# Patient Record
(demographics unavailable — no encounter records)

---

## 2024-10-31 NOTE — HISTORY OF PRESENT ILLNESS
[de-identified] : Referred by: Dr. Sapphire Sharma PCP: Dr. Sue Carr  Diagnosis: Breast Cancer    Ms. Watkins is a post-menopausal female who initially presented at age 55 in 2024 for evaluation of newly diagnosed left breast cancer.  The patient has a medical history of carpal tunnel surgery.   Sarah underwent a screening mammogram in 2024 which revealed a focal asymmetry in the left breast.  Her last mammogram had been 4 years prior to presentation. She denied any breast complaints at that time including breast mass, nipple discharge or breast pain. Diagnostic imaging confirmed a 7 mm hypoechoic mass in the left breast and biopsy was recommended.  Left breast biopsy on 2024 revealed invasive ductal carcinoma, well-differentiated, measuring at least 4 mm, ER greater than 90%, MA 80% and HER2 negative. Breast MRI was performed on 24 and revealed an irregular enhancing mass in the upper central left breast measuring 1.1cm, dominant 4mm focus in the outer left breast. Additional left breast biopsy was benign.  Genetic testing was sent and was negative. She was planning for a left mastectomy with TE placement w/ Dr. Sharma and Dr. Ramos.   She presented to discuss adjuvant therapy for hormone positive breast cancer.  Imaging reviewed: Screening MMG 24- focal asymmetry in the left breast - BIRADS 0 Left breast diagnostic MMG/US 3/12/24- 7mm hypoechoic mass with irregular margins, biopsy recommended, no abnormal LAD, BIRADS 4C Breast MRI 24- irregular enhancing mass in the upper central left breast measuring 1.1cm, dominant 4mm focus in the outer left breast, rec MRI bx, BIRADS 4B  Pathology reviewed: Left breast biopsy 3/22/24- IDC, well differentiated, measuring at least 4mm, ADH, LCIS (classic type), ER >90%, MA 80%, Her2 negative 1+ Left breast biopsy 24- cysts, columnar cell changes  Genetics: Myriad negative 3/27/24    HCM: - Colonoscopy: never done, DUE  - Gyn/pap: pap 24- negative for intraepithelial lesion or malignancy  - Mammo: as above  - Lung cancer screen: reportedly did CT chest for lung cancer screen in March  - DEXA: 24- osteopenia femoral neck T -1.4   SH: - Occupation: she sells wine - Living situation: lives in [place]  - Smoking/etoh/illicits: drinks 1-2 drinks per day, former smoker, quit in 2024 - Exercise: she is active, gardens    OB/GYN Hx: - Age of menarche: 15 - Age of menopause: 45 - Pregnancy history:  - Age at live birth: 26 - OCP use: n/a, IUD  - Fertility treatments:  n/a - Hormonal therapy: n/a - Breast feeding history: 13 months, 8 months    FH: - No family history of breast cancer  [de-identified] : Sarah (Satya) presents on 10/31/24 for follow up for breast cancer.  S/p Left mastectomy w/ SLNbx on 6/6/24 with Dr. Sapphire Sharma and Dr. Lissa Crane  Final pathology revealed IDC, well differentiated, measuring 10mm, extensive LCIS, ADH, LN 0/7 pT1b,N0 Developed a tissue expander infection in early July, s/p tissue expander removal and washout on 7/5/24 Path revealed ulcerated skin with acute inflammation and gangrenous necrosis, breast tissue culture grew group B strep.  Completed IV antibiotics via PICC line Her oncotype score has resulted as 8 with 3% risk of distant recurrence at 9 years and <1% absolute chemotherapy benefit. She has a new tissue expander in place; planned for left IEX with right oncoplastics on 11/15/24 w/ Dr. Crane.  Started Arimidex 9/1/24  At today's visit Satya reports several concerns since starting Arimidex ~ 8 weeks ago.  Hot flashes (present x 10 yrs) have exacerbated, causing nightly sleep disturbance.  She is having generalized arthralgias and worsening of bilateral carpal tunnel symptoms (hx previous open repair on left, will eventually need right CTS).  Her mood is down at times (no SI/HI).  Also notes some "brain fog".  She is preparing for L implant exchange w/ right oncoplastic surgery on 11/14/24 and understandably reluctant to make too many changes prior to surgery.  Will take prophylactic antibiotics around the surgery given hx B strep infection.  She otherwise denies recent headaches, dizziness, visual changes, balance issues, CP, cough, SOB, n/v/d, constipation, unintentional weight loss or new bone pain.  DEXA 2/27/24- mild osteopenia T -1.4

## 2024-10-31 NOTE — HISTORY OF PRESENT ILLNESS
[de-identified] : Referred by: Dr. Sapphire Sharma PCP: Dr. Sue Carr  Diagnosis: Breast Cancer    Ms. Watkins is a post-menopausal female who initially presented at age 55 in 2024 for evaluation of newly diagnosed left breast cancer.  The patient has a medical history of carpal tunnel surgery.   Sarah underwent a screening mammogram in 2024 which revealed a focal asymmetry in the left breast.  Her last mammogram had been 4 years prior to presentation. She denied any breast complaints at that time including breast mass, nipple discharge or breast pain. Diagnostic imaging confirmed a 7 mm hypoechoic mass in the left breast and biopsy was recommended.  Left breast biopsy on 2024 revealed invasive ductal carcinoma, well-differentiated, measuring at least 4 mm, ER greater than 90%, VT 80% and HER2 negative. Breast MRI was performed on 24 and revealed an irregular enhancing mass in the upper central left breast measuring 1.1cm, dominant 4mm focus in the outer left breast. Additional left breast biopsy was benign.  Genetic testing was sent and was negative. She was planning for a left mastectomy with TE placement w/ Dr. Sharma and Dr. Ramos.   She presented to discuss adjuvant therapy for hormone positive breast cancer.  Imaging reviewed: Screening MMG 24- focal asymmetry in the left breast - BIRADS 0 Left breast diagnostic MMG/US 3/12/24- 7mm hypoechoic mass with irregular margins, biopsy recommended, no abnormal LAD, BIRADS 4C Breast MRI 24- irregular enhancing mass in the upper central left breast measuring 1.1cm, dominant 4mm focus in the outer left breast, rec MRI bx, BIRADS 4B  Pathology reviewed: Left breast biopsy 3/22/24- IDC, well differentiated, measuring at least 4mm, ADH, LCIS (classic type), ER >90%, VT 80%, Her2 negative 1+ Left breast biopsy 24- cysts, columnar cell changes  Genetics: Myriad negative 3/27/24    HCM: - Colonoscopy: never done, DUE  - Gyn/pap: pap 24- negative for intraepithelial lesion or malignancy  - Mammo: as above  - Lung cancer screen: reportedly did CT chest for lung cancer screen in March  - DEXA: 24- osteopenia femoral neck T -1.4   SH: - Occupation: she sells wine - Living situation: lives in [place]  - Smoking/etoh/illicits: drinks 1-2 drinks per day, former smoker, quit in 2024 - Exercise: she is active, gardens    OB/GYN Hx: - Age of menarche: 15 - Age of menopause: 45 - Pregnancy history:  - Age at live birth: 26 - OCP use: n/a, IUD  - Fertility treatments:  n/a - Hormonal therapy: n/a - Breast feeding history: 13 months, 8 months    FH: - No family history of breast cancer  [de-identified] : Sarah (Satya) presents on 10/31/24 for follow up for breast cancer.  S/p Left mastectomy w/ SLNbx on 6/6/24 with Dr. Sapphire Sharma and Dr. Lissa Crane  Final pathology revealed IDC, well differentiated, measuring 10mm, extensive LCIS, ADH, LN 0/7 pT1b,N0 Developed a tissue expander infection in early July, s/p tissue expander removal and washout on 7/5/24 Path revealed ulcerated skin with acute inflammation and gangrenous necrosis, breast tissue culture grew group B strep.  Completed IV antibiotics via PICC line Her oncotype score has resulted as 8 with 3% risk of distant recurrence at 9 years and <1% absolute chemotherapy benefit. She has a new tissue expander in place; planned for left IEX with right oncoplastics on 11/15/24 w/ Dr. Crane.  Started Arimidex 9/1/24  At today's visit Satya reports several concerns since starting Arimidex ~ 8 weeks ago.  Hot flashes (present x 10 yrs) have exacerbated, causing nightly sleep disturbance.  She is having generalized arthralgias and worsening of bilateral carpal tunnel symptoms (hx previous open repair on left, will eventually need right CTS).  Her mood is down at times (no SI/HI).  Also notes some "brain fog".  She is preparing for L implant exchange w/ right oncoplastic surgery on 11/14/24 and understandably reluctant to make too many changes prior to surgery.  Will take prophylactic antibiotics around the surgery given hx B strep infection.  She otherwise denies recent headaches, dizziness, visual changes, balance issues, CP, cough, SOB, n/v/d, constipation, unintentional weight loss or new bone pain.  DEXA 2/27/24- mild osteopenia T -1.4

## 2024-10-31 NOTE — PHYSICAL EXAM
[Fully active, able to carry on all pre-disease performance without restriction] : Status 0 - Fully active, able to carry on all pre-disease performance without restriction [Normal] : affect appropriate [de-identified] : well appearing female, NAD, pleasant [de-identified] : s/p L mastectomy, healing well, tissue expander in place, no erythema, no palpable masses or LAD

## 2024-10-31 NOTE — RESULTS/DATA
[FreeTextEntry1] : #Left breast cancer- ER/KY+, Her2 negative, clinical stage IA  Diagnosed on screening mammogram in February 2024. S/p left breast biopsy on 3/22/24 which revealed invasive ductal carcinoma, well-differentiated, measuring at least 4 mm, ER >90%, KY 80% and HER2 negative.  Breast MRI was performed on 4/5/24 and revealed an irregular enhancing mass in the upper central left breast measuring 1.1cm, dominant 4mm focus in the outer left breast. Additional left breast biopsy was benign.   Genetic testing was sent and was negative.  We discussed the excellent prognosis of stage I, ER positive, KY positive, node negative breast cancer. We explained that recent data suggests that chemotherapy may be spared for many patients with this type of breast cancer (up to 85%). We discussed the use of Oncotype DX genomic profile to determine the risk of recurrence and benefit from chemotherapy based on the results of the Tailor Rx Study to better determine which patients should receive chemotherapy in addition to hormonal therapy.   Now s/p Left mastectomy w/ SLNbx on 6/6/24 with Dr. Sapphire Sharma and Dr. Lissa Crane  Final pathology revealed IDC, well differentiated, measuring 10mm, extensive LCIS, ADH, LN 0/7 pT1b,N0 She developed a tissue expander infection in early July, s/p tissue expander removal and washout on 7/5/24 Path revealed ulcerated skin with acute inflammation and gangrenous necrosis, breast tissue culture grew group B strep.  Completed IV antibiotics via PICC line  Her oncotype score has resulted as 8 with 3% risk of distant recurrence at 9 years and <1% absolute chemotherapy benefit. Fortunately no role for chemotherapy.  She has a new tissue expander in place which is being filled slowly w/ Dr. Crane. Planned for L implant exchange w/ right oncoplastics on 11/15/24    We discussed the role of antiestrogen therapy, including treatment with Arimidex 1mg PO daily for 5-7 years. Risks of hot flashes, vaginal dryness and bone loss were reviewed.  Close monitoring of bone density and calcium supplementation reviewed as well. Recommended 30 minutes of exercise daily to prevent joint pains. Started Arimidex 9/1/24 - now w/ worsening hot flushes, arthralgias, brain fog and depressed mood Discussed symptoms at length today - she would like to continue current regimen for now d/t upcoming surgery Labs today including anemia studies, vitamin D, TSH w/ reflex T4 Given resources to initiate counseling, emotional support/encouragement provided Will re-assess AI toxicities at next visit in mid-December Consider change to exemestane if arthralgias do not improve (discussed glucosamine/chondroitin and omega 3 supplements) Consider Veozah if hot flushes fail to improve DEXA 2/27/24- mild osteopenia T -1.4 Start calcium and vitamin D.  All patient questions answered at today's visit. Patient urged to call the office with any questions or concerns.  I personally have spent a total of 45 minutes of time on the date of this encounter reviewing test results, documenting findings, coordinating care and directly consulting with the patient and/or designated family member. Greater than 50% of the face to face encounter time was spent on counseling and/or coordination of care for left breast cancer.

## 2024-10-31 NOTE — PHYSICAL EXAM
[Fully active, able to carry on all pre-disease performance without restriction] : Status 0 - Fully active, able to carry on all pre-disease performance without restriction [Normal] : affect appropriate [de-identified] : well appearing female, NAD, pleasant [de-identified] : s/p L mastectomy, healing well, tissue expander in place, no erythema, no palpable masses or LAD

## 2024-10-31 NOTE — RESULTS/DATA
[FreeTextEntry1] : #Left breast cancer- ER/AR+, Her2 negative, clinical stage IA  Diagnosed on screening mammogram in February 2024. S/p left breast biopsy on 3/22/24 which revealed invasive ductal carcinoma, well-differentiated, measuring at least 4 mm, ER >90%, AR 80% and HER2 negative.  Breast MRI was performed on 4/5/24 and revealed an irregular enhancing mass in the upper central left breast measuring 1.1cm, dominant 4mm focus in the outer left breast. Additional left breast biopsy was benign.   Genetic testing was sent and was negative.  We discussed the excellent prognosis of stage I, ER positive, AR positive, node negative breast cancer. We explained that recent data suggests that chemotherapy may be spared for many patients with this type of breast cancer (up to 85%). We discussed the use of Oncotype DX genomic profile to determine the risk of recurrence and benefit from chemotherapy based on the results of the Tailor Rx Study to better determine which patients should receive chemotherapy in addition to hormonal therapy.   Now s/p Left mastectomy w/ SLNbx on 6/6/24 with Dr. Sapphire Sharma and Dr. Lissa Crane  Final pathology revealed IDC, well differentiated, measuring 10mm, extensive LCIS, ADH, LN 0/7 pT1b,N0 She developed a tissue expander infection in early July, s/p tissue expander removal and washout on 7/5/24 Path revealed ulcerated skin with acute inflammation and gangrenous necrosis, breast tissue culture grew group B strep.  Completed IV antibiotics via PICC line  Her oncotype score has resulted as 8 with 3% risk of distant recurrence at 9 years and <1% absolute chemotherapy benefit. Fortunately no role for chemotherapy.  She has a new tissue expander in place which is being filled slowly w/ Dr. Crane. Planned for L implant exchange w/ right oncoplastics on 11/15/24    We discussed the role of antiestrogen therapy, including treatment with Arimidex 1mg PO daily for 5-7 years. Risks of hot flashes, vaginal dryness and bone loss were reviewed.  Close monitoring of bone density and calcium supplementation reviewed as well. Recommended 30 minutes of exercise daily to prevent joint pains. Started Arimidex 9/1/24 - now w/ worsening hot flushes, arthralgias, brain fog and depressed mood Discussed symptoms at length today - she would like to continue current regimen for now d/t upcoming surgery Labs today including anemia studies, vitamin D, TSH w/ reflex T4 Given resources to initiate counseling, emotional support/encouragement provided Will re-assess AI toxicities at next visit in mid-December Consider change to exemestane if arthralgias do not improve (discussed glucosamine/chondroitin and omega 3 supplements) Consider Veozah if hot flushes fail to improve DEXA 2/27/24- mild osteopenia T -1.4 Start calcium and vitamin D.  All patient questions answered at today's visit. Patient urged to call the office with any questions or concerns.  I personally have spent a total of 45 minutes of time on the date of this encounter reviewing test results, documenting findings, coordinating care and directly consulting with the patient and/or designated family member. Greater than 50% of the face to face encounter time was spent on counseling and/or coordination of care for left breast cancer.

## 2024-11-05 NOTE — PHYSICAL EXAM
[FreeTextEntry1] : Gen: Patient is A&O x 3, NAD HEENT: EOMI, hearing grossly normal Resp: regular, non - labored CV: pulses regular Skin: no rashes, erythema Lymph: no clubbing, cyanosis, edema Inspection: no instability  ROM: full throughout Palpation:no tenderness to palpation Sensation: intact to light touch Reflexes: 1+ and symmetric throughout Strength: 5/5 throughout Gait: normal, non-antalgic

## 2024-11-05 NOTE — ASSESSMENT
[FreeTextEntry1] : 55 year old female presenting for evaluation.  #Aromatase Inhibitor Arthralgias/Hot flashes: -Decrease gabapentin to 100mg nightly-rx sent  -Consider duloxetine at next visit   #Post-mastectomy pain: -gabapentin -Pending reconstructive surgery, consider PT after    #At risk for lymphedema: -No clinical signs of lymphedema on exam -Lymphedema education provided to patient -Denies any electronic implantable devices  -Next surveillance in December 2024  Follow up in 4-6 weeks.

## 2024-11-05 NOTE — HISTORY OF PRESENT ILLNESS
[FreeTextEntry1] : Ms. Watkins is a 55 year old female with history of Left breast cancer- ER/ND+, Her2 negative, clinical stage IA Diagnosed on screening mammogram in February 2024.  S/p left breast biopsy on 3/22/24 which revealed invasive ductal carcinoma, well-differentiated, measuring at least 4 mm, ER >90%, ND 80% and HER2 negative.  She is s/p Left mastectomy w/ SLNbx on 6/6/24 with Dr. Sapphire Sharma and Dr. Lissa Crane, Final pathology revealed IDC, well differentiated, measuring 10mm, extensive LCIS, ADH, LN 0/7 pT1b,N0.   She developed a tissue expander infection in early July, s/p tissue expander removal and washout on 7/5/24. On arimidex.    She reports she tried gabapentin 300mg nightly.  It helped but made her tired in the morning.  Still feels hot flashes and joint pains in her body.  Denies any swelling or edema in her UE.

## 2024-11-05 NOTE — REVIEW OF SYSTEMS
[Negative] : Heme/Lymph [FreeTextEntry2] : +Hot flashes  [FreeTextEntry9] : +Intermittent chest wall pain, +Joint pains

## 2024-11-12 NOTE — HISTORY OF PRESENT ILLNESS
[FreeTextEntry1] : Pt presents for follow up to discuss her surgery scheduled for this Friday and to review the silicone implant consent. She reports she has been thinking about it and is leaning toward not doing any procedures on her right breast. She has been living with the discrepancy in size for a few months now and it is not bothering her as much as it was in the beginning. This has caused her to question whether she really needs to do anything to disrupt the right breast at all.

## 2024-11-12 NOTE — PHYSICAL EXAM
[NI] : Normal [de-identified] : Left TE in place   No erythema, dehiscence, edema, no seroma, no pus or evidence of infection.   [de-identified] : no left breast, s/p mastectomy Right breast without change.

## 2024-11-12 NOTE — ASSESSMENT
[FreeTextEntry1] : We discussed changing her surgical plan to just addressing the left side for now. She understands that is the asymmetry is problematic in the future, that we can go back and do the right breast reduction if needed. For now, we will proceed with left breast implant exchange and autologous fat grafting from the abdomen to the left reconstructed breast. We discussed implant risks and went over the [facility] silicone implant consent checklist page by page. In summary, we discussed that implants are not lifetime devices. There are risks of capsular contracture (higher after radiation), implant rupture, breast implant associated lymphoma (NISHA-ALCL), infection requiring removal and poor position and/or contour. We discussed systemic symptoms that may be associated with breast implants that may or may not resolve with removal. She is on preoperative antibiotics currently due to her prior left TE infection, in order to decrease her risk of implant infection.

## 2024-11-20 NOTE — ASSESSMENT
[FreeTextEntry1] : 56 y/o female for follow up   Healing well Follow up in 2-3 weeks. All questions and concerns addressed. Patient may drive as long as she can move arms freely and is no longer taking narcotics.  Plan and patient status as per Dr Crane.

## 2024-11-20 NOTE — REASON FOR VISIT
[Follow-Up: _____] : a [unfilled] follow-up visit [FreeTextEntry1] : from post op has soreness from surgery. Feeling dizzy and tired

## 2024-11-20 NOTE — PHYSICAL EXAM
[NI] : Normal [de-identified] : Right breast with mild ecchymosis in area of fat grafting.  [de-identified] : No left breast, s/p mastectomy. Left implant in place with moderate ecchymosis, mild edema, no erythema or dehiscence. [de-identified] : Fat grafting donor site with sutures in place, closed and healing, no erythema, edema, or continued leakage.

## 2024-11-20 NOTE — HISTORY OF PRESENT ILLNESS
[FreeTextEntry1] : Patient presents for follow up and states that she is feeling a bit sore in her abdomen like she performed sit ups.  She states that she is really happy with the results of her surgery and took a shower last night.  She states that as of now, she will likely have a tattoo of the NAC and no creation of a nipple.  She has no complaints today.

## 2024-11-20 NOTE — PHYSICAL EXAM
[NI] : Normal [de-identified] : Right breast with mild ecchymosis in area of fat grafting.  [de-identified] : No left breast, s/p mastectomy. Left implant in place with moderate ecchymosis, mild edema, no erythema or dehiscence. [de-identified] : Fat grafting donor site with sutures in place, closed and healing, no erythema, edema, or continued leakage.

## 2024-12-12 NOTE — PHYSICAL EXAM
[NI] : Normal [de-identified] : Left implant in place, soft. No erythema, dehiscence, edema, no seroma, no pus or evidence of infection.   She does have a punctate fine red rash over the left breast and laterally on the right breast. [de-identified] : no left breast, s/p mastectomy Right breast without change.

## 2024-12-12 NOTE — ASSESSMENT
[FreeTextEntry1] : Doing well overall Pt using a new lotion over the left breast and some other areas. I advised her to stop the lotion and use something more hypoallergenic. There is no evidence of erythema or infection. Follow up in two months. Call for any questions or concerns.

## 2024-12-12 NOTE — HISTORY OF PRESENT ILLNESS
[FreeTextEntry1] : Pt reports she is doing well. Some soreness laterally and occasionally superiorly.

## 2024-12-12 NOTE — REASON FOR VISIT
[Follow-Up: _____] : a [unfilled] follow-up visit [FreeTextEntry1] : Patient is following up from her surgery 1 month ago, she states she still has soreness and is taking it easy with lifting. No concerns at this time.

## 2024-12-12 NOTE — PHYSICAL EXAM
[NI] : Normal [de-identified] : Left implant in place, soft. No erythema, dehiscence, edema, no seroma, no pus or evidence of infection.   She does have a punctate fine red rash over the left breast and laterally on the right breast. [de-identified] : no left breast, s/p mastectomy Right breast without change.

## 2024-12-19 NOTE — RESULTS/DATA
[FreeTextEntry1] : #Left breast cancer- ER/[place]+, Her2 negative, clinical stage IA  Diagnosed on screening mammogram in February 2024. S/p left breast biopsy on 3/22/24 which revealed invasive ductal carcinoma, well-differentiated, measuring at least 4 mm, ER >90%, [place] 80% and HER2 negative.  Breast MRI was performed on 4/5/24 and revealed an irregular enhancing mass in the upper central left breast measuring 1.1cm, dominant 4mm focus in the outer left breast. Additional left breast biopsy was benign.   Genetic testing was sent and was negative.  We discussed the excellent prognosis of stage I, ER positive, W[place] positive, node negative breast cancer. We explained that recent data suggests that chemotherapy may be spared for many patients with this type of breast cancer (up to 85%). We discussed the use of Oncotype DX genomic profile to determine the risk of recurrence and benefit from chemotherapy based on the results of the [employer] Rx Study to better determine which patients should receive chemotherapy in addition to hormonal therapy.   Now s/p Left mastectomy w/ SLNbx on 6/6/24 with Dr. Sapphire Sharma and Dr. Lissa Crane  Final pathology revealed IDC, well differentiated, measuring 10mm, extensive LCIS, ADH, LN 0/7 pT1b,N0 She developed a tissue expander infection in early July, s/p tissue expander removal and washout on 7/5/24 Path revealed ulcerated skin with acute inflammation and gangrenous necrosis, breast tissue culture grew group B strep.  Completed IV antibiotics via PICC line  Her oncotype score has resulted as 8 with 3% risk of distant recurrence at 9 years and <1% absolute chemotherapy benefit. Fortunately no role for chemotherapy.  She had a new tissue expander placed; now s/p L implant exchange w/ Dr. Crane 11/15/24 and healing well She elected not to proceed w/ right oncoplastics    We discussed the role of antiestrogen therapy, including treatment with Arimidex 1mg PO daily for 5-7 years. Risks of hot flashes, vaginal dryness and bone loss were reviewed.  Close monitoring of bone density and calcium supplementation reviewed as well. Recommended 30 minutes of exercise daily to prevent joint pains. Started Arimidex 9/1/24 - now w/ worsening hot flushes, arthralgias, brain fog and depressed mood. Hot flushes slowly improving - no interest in medication management (including Veozah) at this time Discussed use of vaginal lubricants, water based moisturizers and role of low dose vaginal estrogen if needed (felt to have minimal systemic absorption and convey low risk for breast cancer recurrence) Discussed symptoms at length today - she would like to continue Anastrozole for now Has been given resources to initiate counseling, emotional support/encouragement provided Consider change to exemestane if arthralgias exacerbate (discussed glucosamine/chondroitin and omega 3 supplements) DEXA 2/27/24- mild osteopenia T -1.4 Started calcium and vitamin D.  All patient questions answered at today's visit. Patient urged to call the office with any questions or concerns.  I personally have spent a total of 40 minutes of time on the date of this encounter reviewing test results, documenting findings, coordinating care and directly consulting with the patient and/or designated family member. Greater than 50% of the face to face encounter time was spent on counseling and/or coordination of care for left breast cancer.

## 2024-12-19 NOTE — HISTORY OF PRESENT ILLNESS
[de-identified] : Referred by: Dr. Sapphire Sharma PCP: Dr. Sue Carr  Diagnosis: Breast Cancer    Ms. Watkins is a post-menopausal female who initially presented at age 55 in 2024 for evaluation of newly diagnosed left breast cancer.  The patient has a medical history of carpal tunnel surgery.   Sarah underwent a screening mammogram in 2024 which revealed a focal asymmetry in the left breast.  Her last mammogram had been 4 years prior to presentation. She denied any breast complaints at that time including breast mass, nipple discharge or breast pain. Diagnostic imaging confirmed a 7 mm hypoechoic mass in the left breast and biopsy was recommended.  Left breast biopsy on 2024 revealed invasive ductal carcinoma, well-differentiated, measuring at least 4 mm, ER greater than 90%, [place] 80% and HER2 negative. Breast MRI was performed on 24 and revealed an irregular enhancing mass in the upper central left breast measuring 1.1cm, dominant 4mm focus in the outer left breast. Additional left breast biopsy was benign.  Genetic testing was sent and was negative. She was planning for a left mastectomy with TE placement w/ Dr. Sharma and Dr. Ramos.   She presented to discuss adjuvant therapy for hormone positive breast cancer.  Imaging reviewed: Screening MMG 24- focal asymmetry in the left breast - BIRADS 0 Left breast diagnostic MMG/US 3/12/24- 7mm hypoechoic mass with irregular margins, biopsy recommended, no abnormal LAD, BIRADS 4C Breast MRI 24- irregular enhancing mass in the upper central left breast measuring 1.1cm, dominant 4mm focus in the outer left breast, rec MRI bx, BIRADS 4B  Pathology reviewed: Left breast biopsy 3/22/24- IDC, well differentiated, measuring at least 4mm, ADH, LCIS (classic type), ER >90%, IL 80%, Her2 negative 1+ Left breast biopsy 24- cysts, columnar cell changes  Genetics: Myriad negative 3/27/24    HCM: - Colonoscopy: never done, DUE  - Gyn/pap: pap 24- negative for intraepithelial lesion or malignancy  - Mammo: as above  - Lung cancer screen: reportedly did CT chest for lung cancer screen in March  - DEXA: 24- osteopenia femoral neck T -1.4   SH: - Occupation: she sells wine - Living situation: lives in [place]  - Smoking/etoh/illicits: drinks 1-2 drinks per day, former smoker, quit in 2024 - Exercise: she is active, gardens    OB/GYN Hx: - Age of menarche: 15 - Age of menopause: 45 - Pregnancy history:  - Age at live birth: 26 - OCP use: n/a, IUD  - Fertility treatments:  n/a - Hormonal therapy: n/a - Breast feeding history: 13 months, 8 months    FH: - No family history of breast cancer  [de-identified] : Sarah (Satya) presents on 12/19/24 for follow up for breast cancer.  S/p Left mastectomy w/ SLNbx on 6/6/24 with Dr. Sapphire Sharma and Dr. Lissa Crane  Final pathology revealed IDC, well differentiated, measuring 10mm, extensive LCIS, ADH, LN 0/7 pT1b,N0 Developed a tissue expander infection in early July, s/p tissue expander removal and washout on 7/5/24 Path revealed ulcerated skin with acute inflammation and gangrenous necrosis, breast tissue culture grew group B strep.  Completed IV antibiotics via PICC line Her oncotype score has resulted as 8 with 3% risk of distant recurrence at 9 years and <1% absolute chemotherapy benefit. She had a new tissue expander placed; now s/p L IEX on 11/15/24 w/ Dr. Crane (elected to defer R oncoplastics). Started Arimidex 9/1/24  At today's visit Satya continues Arimidex daily and is feeling a little better although several symptoms persist.  Hot flushes are slightly improved - no interest in medication management at present.  Pre-existing dyspareunia exacerbated w/ onset of AI therapy despite water based lubricants.  Mood somewhat improved.  Still with brain fog.  Mild joint stiffness persists, improves with activity.  Hx of bilateral carpal tunnel symptoms (with previous open repair on left, will eventually need right CTS).  She notes dry, itchy skin and very faint punctate skin changes of the upper chest and upper back (likely existed prior to start of AI therapy). Denies recent breast changes, headaches, dizziness, visual changes, balance issues, CP, cough, SOB, n/v/d, constipation, unintentional weight loss or new bone pain.  Health Care Maintenance: Updated 12/19/24 Breast Imaging: Right mammogram due 3/2025 Colonoscopy:  DUE - reminded to schedule Gyn/Pap: DUE - reminded to schedule Dentist: PCP: Dermatology screen:  DUE - reminded to schedule DEXA 2/27/24- mild osteopenia T -1.4

## 2024-12-19 NOTE — PHYSICAL EXAM
[Fully active, able to carry on all pre-disease performance without restriction] : Status 0 - Fully active, able to carry on all pre-disease performance without restriction [Normal] : affect appropriate [de-identified] : well appearing female, NAD, pleasant [de-identified] : s/p L mastectomy with implant reconstruction, no erythema, no palpable masses or LAD  [de-identified] : Very faint, punctate skin changes of anterior chest and mid back noted (no evidence for recent scratching).

## 2024-12-27 NOTE — PHYSICAL EXAM
[NI] : Normal [de-identified] : Left implant in place, soft. No erythema, edema, or evidence of infection.   There may be some fluid surrounding the implant. She does have a punctate fine raised red rash over the left breast and laterally on the right breast. [de-identified] : no left breast, s/p mastectomy Right breast without change.

## 2024-12-27 NOTE — HISTORY OF PRESENT ILLNESS
[FreeTextEntry1] : Pt reports she started feeling sick "like a sinus infection" several days ago, and then tested positive for COVID.  She noticed that the left reconstructed breast felt a bit more swollen and sore. She denies fever or chills.  She does still notice the "dots" of a rash on the left breast, but also on her neck and back. She has stopped any type of perfumed lotion, and has feels it's better, but hasn't gone away. She was told it could be from the anastrozole as well.

## 2024-12-27 NOTE — PHYSICAL EXAM
[NI] : Normal [de-identified] : Left implant in place, soft. No erythema, edema, or evidence of infection.   There may be some fluid surrounding the implant. She does have a punctate fine raised red rash over the left breast and laterally on the right breast. [de-identified] : no left breast, s/p mastectomy Right breast without change.

## 2024-12-27 NOTE — ASSESSMENT
[FreeTextEntry1] : Doing well overall. She may have some increase in mayco-implant fluid with her COVID infection, but there is no clinical sign of infection. She reports she does have a derm appointment in a few weeks and will ask about the rash then. I would not recommend any antibiotics in the absence of any clinical sign of infection. She is to keep her regularly scheduled appointment (approx 6 weeks), but call before then for any issues or concerns.

## 2024-12-27 NOTE — REASON FOR VISIT
[Follow-Up: _____] : a [unfilled] follow-up visit [FreeTextEntry1] : Patient presents for a follow up today, states she is having some issues with her left breast that she would like to go over with the doctor.

## 2025-01-07 NOTE — PHYSICAL EXAM
[FreeTextEntry1] : Gen: Patient is A&O x 3, NAD HEENT: EOMI, hearing grossly normal Resp: regular, non - labored CV: pulses regular Skin: no rashes, erythema Lymph: no clubbing, cyanosis, edema Inspection: no instability  ROM: full throughout Palpation:no tenderness to palpation Sensation: intact to light touch Reflexes: 1+ and symmetric throughout Strength: 5/5 throughout Gait: normal, non-antalgic  Bioimpedance spectroscopy performed today with L-Dex -3.5 LUE (pre-op baseline -2.6)

## 2025-01-07 NOTE — ASSESSMENT
[FreeTextEntry1] : 55 year old female presenting for evaluation.  #Aromatase Inhibitor Arthralgias/Hot flashes: -Continue gabapentin 100mg nightly-rx sent   #Post-mastectomy pain/Breast cancer: -gabapentin  -Educated patient on home exercise program with goal of moderate intensity aerobic activity 150 to 300 minutes/week. -Educated on low resistance strength training of major muscle groups 2-3 times per week.     #At risk for lymphedema: -No clinical signs of lymphedema on exam -Lymphedema education provided to patient -Denies any electronic implantable devices  -Bioimpedance spectroscopy performed today with L-dex appropriate. -Next surveillance in April 2025  Follow up in 3 months.  The patient had a follow-up SOZO measurement which I reviewed today.  Bioimpedance spectroscopy helps identify the onset of lymphedema in an arm or leg before patients experience noticeable swelling. Research has shown that the early detection of lymphedema using L-Dex combined with treatment can reduce progression to chronic lymphedema by 95% in breast cancer patients. Whenever possible, patients are tested for baseline L-Dex score before cancer treatment begins and then are reassessed during regular follow-up visits using the SOZO device. Otherwise, this can be started postoperatively and continued during regular follow-up visits. If the patients L-Dex score increases above normal levels, that is a sign that lymphedema is developing, and a referral is made to physical therapy for further evaluation and/or early compression treatment. Lymphedema assessment with the SOZO L-Dex score is recommended to be done every 3 months for the first 3 years and then every 6 months for years 4 and 5, followed by annually afterwards.

## 2025-01-07 NOTE — HISTORY OF PRESENT ILLNESS
[FreeTextEntry1] : Ms. Watkins is a 55 year old female with history of Left breast cancer- ER/[place]+, Her2 negative, clinical stage IA Diagnosed on screening mammogram in February 2024.  S/p left breast biopsy on 3/22/24 which revealed invasive ductal carcinoma, well-differentiated, measuring at least 4 mm, ER >90%, [place] 80% and HER2 negative.  She is s/p Left mastectomy w/ SLNbx on 6/6/24 with Dr. Sapphire Sharma and Dr. Lissa Crane, Final pathology revealed IDC, well differentiated, measuring 10mm, extensive LCIS, ADH, LN 0/7 pT1b,N0.   She developed a tissue expander infection in early July, s/p tissue expander removal and washout on 7/5/24. S/p Left implant exchange 11/15/24.  On arimidex.    She reports that overall joint pains and hot flashes are improving.  Continues on gabapentin at night denies any side effects this.  Thinks is helping.  Reports good range of motion.  Denies any swelling or heaviness in her upper extremities.

## 2025-02-04 NOTE — HEALTH RISK ASSESSMENT
[0] : 2) Feeling down, depressed, or hopeless: Not at all (0) [PHQ-2 Negative - No further assessment needed] : PHQ-2 Negative - No further assessment needed [[id number]] : 0

## 2025-02-04 NOTE — PLAN
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

## 2025-02-04 NOTE — HISTORY OF PRESENT ILLNESS
[FreeTextEntry1] : Establishment of care [de-identified] : Postmastectomy pain syndrome  2 kids  1 gc  breast ca   carpal tunnel 1/1  sell wine    tob stopped    etoh   wine  walks daily   dog

## 2025-02-12 NOTE — ASSESSMENT
[FreeTextEntry1] : 55 y/o female for follow up   Healing well Follow up with dermatology Follow up with us in 3 months, will discuss NAC tattoo at that time.  All questions and concerns addressed. Plan and patient status as per Dr Crane.

## 2025-02-12 NOTE — PHYSICAL EXAM
[NI] : Normal [de-identified] : Right breast with mild rash on outer quadrant. [de-identified] : No left breast, s/p mastectomy. Left implant in place.  Soft and all incisions healed well. Rash noted, but much smaller area of skin affected than last visit.

## 2025-02-12 NOTE — HISTORY OF PRESENT ILLNESS
[FreeTextEntry1] : Patient presents for follow up and denies any signs of infection.  She has not had any redness, swelling, irritation as she had experienced in the past. She states the rash that she had on her left reconstructed breast has decreased in severity, but she still has a small area on the left and right breast as well.  She plans on following up with her dermatologist in the near future.  Her  had Andreia Barr virus, and she thought that maybe her rash had something to do with that.  No other complaints.

## 2025-02-13 NOTE — HISTORY OF PRESENT ILLNESS
[Patient reported mammogram was normal] : Patient reported mammogram was normal [Patient reported PAP Smear was normal] : Patient reported PAP Smear was normal [Patient reported bone density results were abnormal] : Patient reported bone density results were abnormal [HIV Test offered] : HIV Test offered [Syphilis test offered] : Syphilis test offered [Gonorrhea test offered] : Gonorrhea test offered [Chlamydia test offered] : Chlamydia test offered [Trichomonas test offered] : Trichomonas test offered [HPV test offered] : HPV test offered [Hepatitis B test offered] : Hepatitis B test offered [Hepatitis C test offered] : Hepatitis C test offered [Y] : Patient is sexually active [FreeTextEntry1] : 55yo female here today for annual exam. Diagnosed with left breast malignancy 3/2024, she is s/p left mastectomy and TE 6/2024, follows with Dr. Sharma, Dr. Crane, Dr. Byrne and Dr. Mercer. Continues on AI, notes menopausal symptoms have returned, utilizing supplement called Thermella from [employer] which is helping with hot flashes.  [Mammogramdate] : 2/3/25 [PapSmeardate] : 2/5/25 [BoneDensityDate] : 2/27/24 [TextBox_37] : osteopenia [facility] : 2

## 2025-02-13 NOTE — PLAN
[FreeTextEntry1] : CBE: Left breast s/p mastectomy with implant. Right breast no masses, lesions or discharge, mammogram earlier this month was BR1. Patient notes ongoing rash to chest wall, evaluated by Dr. Barajas (plastic surgery) recently and much improved per patient from initial presentation. Dermatologist recommendations provided.  Discussed patient also needs colonoscopy, previously had consult with Dr. Mkcenna prior to breast cancer workup, encouraged to schedule follow up appointment.  Pap collected today, STI testing offered and patient consents.  Patient inquiring about vaginal estrogen, risk/benefits discussed. Reviewed with Dr. Sharma (breast surgery) and Dr. Byrne (medical oncology) via Teams, ok per both physicians to proceed with low dose topical vaginal estrogen for atrophic vaginitis given ongoing discomfort.  Follow up in 1year or sooner as needed.

## 2025-02-13 NOTE — PLAN
[FreeTextEntry1] : CBE: Left breast s/p mastectomy with implant. Right breast no masses, lesions or discharge, mammogram earlier this month was BR1. Patient notes ongoing rash to chest wall, evaluated by Dr. Barajas (plastic surgery) recently and much improved per patient from initial presentation. Dermatologist recommendations provided.  Discussed patient also needs colonoscopy, previously had consult with Dr. Mckenna prior to breast cancer workup, encouraged to schedule follow up appointment.  Pap collected today, STI testing offered and patient consents.  Patient inquiring about vaginal estrogen, risk/benefits discussed. Reviewed with Dr. Sharma (breast surgery) and Dr. Byrne (medical oncology) via Teams, ok per both physicians to proceed with low dose topical vaginal estrogen for atrophic vaginitis given ongoing discomfort.  Follow up in 1year or sooner as needed.

## 2025-02-13 NOTE — HISTORY OF PRESENT ILLNESS
[Patient reported mammogram was normal] : Patient reported mammogram was normal [Patient reported PAP Smear was normal] : Patient reported PAP Smear was normal [Patient reported bone density results were abnormal] : Patient reported bone density results were abnormal [HIV Test offered] : HIV Test offered [Syphilis test offered] : Syphilis test offered [Gonorrhea test offered] : Gonorrhea test offered [Chlamydia test offered] : Chlamydia test offered [Trichomonas test offered] : Trichomonas test offered [HPV test offered] : HPV test offered [Hepatitis B test offered] : Hepatitis B test offered [Hepatitis C test offered] : Hepatitis C test offered [Y] : Patient is sexually active [FreeTextEntry1] : 55yo female here today for annual exam. Diagnosed with left breast malignancy 3/2024, she is s/p left mastectomy and TE 6/2024, follows with Dr. Sharma, Dr. Crane, Dr. Byrne and Dr. Mercer. Continues on AI, notes menopausal symptoms have returned, utilizing supplement called Thermella from [employer] which is helping with hot flashes.  [Mammogramdate] : 2/3/25 [PapSmeardate] : 2/5/25 [BoneDensityDate] : 2/27/24 [TextBox_37] : osteopenia [[facility]] : 2

## 2025-02-13 NOTE — PHYSICAL EXAM
[Appropriately responsive] : appropriately responsive [Alert] : alert [No Acute Distress] : no acute distress [No Lymphadenopathy] : no lymphadenopathy [Soft] : soft [Non-tender] : non-tender [Non-distended] : non-distended [No Lesions] : no lesions [No Mass] : no mass [Oriented x3] : oriented x3 [The Right Breast Was Examined] : a normal appearance [Breast Nipple Inversion Right] : inverted [Breast Mass Right Breast ___cm] : no was mass palpable [] : an implant [Left Breast Absent] : a total mastectomy [Vulvar Atrophy] : vulvar atrophy [Labia Majora] : normal [Labia Minora] : normal [Atrophy] : atrophy [Normal] : normal [Uterine Adnexae] : normal

## 2025-02-27 NOTE — CONSULT LETTER
[Dear  ___] : Dear  [unfilled], [Courtesy Letter:] : I had the pleasure of seeing your patient, [unfilled], in my office today. [Please see my note below.] : Please see my note below. [Sincerely,] : Sincerely, [FreeTextEntry3] : Sapphire Sharma MD

## 2025-02-27 NOTE — DATA REVIEWED
[FreeTextEntry1] : 2/3/2025 Olivia RAO dMMG: There are scattered areas of FG density. No suspicious mass, suspicious microcalcifications, or other sign of malignancy is identified. IMPRESSION: No mammographic evidence of malignancy. RECOMMENDATION: Mammography in 1 year. BR1.

## 2025-02-27 NOTE — ASSESSMENT
[FreeTextEntry1] : PCP Rustam Cotto MD  Patient is a 56-year-old, Myriad negative, female here today for follow up visit.  She is s/p 6/6/24 L mastectomy and SLNBx with TE per Dr. Crane after US bx L 12:00 N3 resulted as IDC, Gr1, measures at least 4mm, ADH, LCIS, classic type. ER+ >90%, PA+ >80%, Her2 negative. Also had MRI biopsy L breast which was benign.  Surgery had been delayed as she underwent carpal tunnel surgery prior to breast surgery.  6/6/24 Surgical path: IDC Gr1, 1.0cm. All margins negative, >5mm. No DCIS. Extensive LCIS, classic type. LCIS involves lactiferous ducts. ADH. No lymphatic or vascular invasion. 0/7LN negative. pT1bN0/7   She had second stage surgery 11/15/24 with Dr. Crane.   Currently on Arimidex since 9/1/24. Was started on vaginal estradiol twice weekly recently for dyspareunia and atrophic vaginitis by GYN.  Med/Onc is Dr. Byrne/Sita Ann NP. Oncotype resulted as 8, <1% CT benefit.  DEXA 2/2024 showed mild osteopenia.  3/18/24 Olivia, L 12:00 N3 US guided bx: IDC, Gr1, measures at least 4mm, ADH, LCIS, classic type. ER+ >90%, PA+ >80%, Her2 negative. Concordant. Rec surgical or oncological management.  4/5/24 ADAN Baker: R- No susp enhancement. L- Irregular enhancing mass in the upper central left breast at 12:00 axis measuring 1.1 cm and containing biopsy marker. This is 2.5 cm anterior to the chest wall. There is a dominant 4 mm focus in the outer left breast ([id number]) for which MRI guided bx is rec. Axilla/other: No significant axillary or internal mammary lymphadenopathy. Impression: moderate to severe misregistration is visualized within the bilateral breasts, secondary to patient motion, therefore limiting sensitivity of this breast MRI. Bx-proven malignancy in the 12:00 axis of the left breast measuring up to 1.1 cm. Continued surgical/oncologic management is recommended. Dominant 4 mm focus of enhancement in the outer left breast for which MRI guided core needle biopsy is recommended. Rec MR Bx. BR4B.  4/22/24 JASKARAN Baker MR guided breast bx: Columnar cell change and cysts. Benign and Concordant. Rec MRI in 1 yr.  2/3/2025 Olivia RAO dMMG: There are scattered areas of FG density. No suspicious mass, suspicious microcalcifications, or other sign of malignancy is identified. IMPRESSION: No mammographic evidence of malignancy. RECOMMENDATION: Mammography in 1 year. BR1.  Fhx: No known family history of cancers. Not AJ. 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 Carpal tunnel surgery at the end of June ?  CBE: 38C left mastectomy with implant, breast and axillary scars healed well. no NAC. No axillary or SC lymphadenopathy. No lymphedema. Has full ROM.   Reviewed recent BR1 right mmg and negative CBE.  Pt saw Dr. Mercer and has f.u with him in April. She also has f.u with Dr. Mercer. She is planning on Left NAC recon but not sure about the right symmetry surgery.  PLAN: Continue anastrazole. SOZO due in April. F.u with us 6 mos.

## 2025-02-27 NOTE — PAST MEDICAL HISTORY
[Postmenopausal] : The patient is postmenopausal [Menarche Age ____] : age at menarche was [unfilled] [Menopause Age____] : age at menopause was [unfilled] [Approximately ___] : the LMP was approximately [unfilled] [Total Preg ___] : G[unfilled] [Live Births ___] : P[unfilled]  [Age At Live Birth ___] : Age at live birth: [unfilled] [History of Hormone Replacement Treatment] : has no history of hormone replacement treatment [FreeTextEntry7] : yes 6 years [FreeTextEntry8] : yes 12 months

## 2025-02-27 NOTE — PHYSICAL EXAM
[Normocephalic] : normocephalic [Atraumatic] : atraumatic [Supple] : supple [No Supraclavicular Adenopathy] : no supraclavicular adenopathy [No Thyromegaly] : no thyromegaly [Examined in the supine and seated position] : examined in the supine and seated position [Asymmetrical] : asymmetrical [No dominant masses] : no dominant masses in right breast  [No dominant masses] : no dominant masses left breast [No Nipple Retraction] : no right nipple retraction [No Nipple Discharge] : no right nipple discharge [No Axillary Lymphadenopathy] : no left axillary lymphadenopathy [No Edema] : no edema [No Swelling] : no swelling [Full ROM] : full range of motion [No Rashes] : no rashes [No Ulceration] : no ulceration [TextEntry] : Psych: Appropriate, NAD, A&Ox3 Neuro: Intact grossly, gait normal

## 2025-02-27 NOTE — HISTORY OF PRESENT ILLNESS
[FreeTextEntry1] : PCP Rustam Cotto MD  Patient is a 56-year-old, Myriad negative, female here today for follow up visit.  She is s/p 6/6/24 L mastectomy and SLNBx with TE per Dr. Crane after US bx L 12:00 N3 resulted as IDC, Gr1, measures at least 4mm, ADH, LCIS, classic type. ER+ >90%, NE+ >80%, Her2 negative. Also had MRI biopsy L breast which was benign.  Surgery had been delayed as she underwent carpal tunnel surgery prior to breast surgery.  6/6/24 Surgical path: IDC Gr1, 1.0cm. All margins negative, >5mm. No DCIS. Extensive LCIS, classic type. LCIS involves lactiferous ducts. ADH. No lymphatic or vascular invasion. 0/7LN negative. pT1bN0/7   She had second stage surgery 11/15/24 with Dr. Crane.   Currently on Arimidex since 9/1/24. Was started on vaginal estradiol twice weekly recently for dyspareunia and atrophic vaginitis by GYN.  Med/Onc is Dr. Byrne/Sita Ann NP. Oncotype resulted as 8, <1% CT benefit.  DEXA 2/2024 showed mild osteopenia.  3/18/24 Olivia, L 12:00 N3 US guided bx: IDC, Gr1, measures at least 4mm, ADH, LCIS, classic type. ER+ >90%, NE+ >80%, Her2 negative. Concordant. Rec surgical or oncological management.  4/5/24 ADAN Baker: R- No susp enhancement. L- Irregular enhancing mass in the upper central left breast at 12:00 axis measuring 1.1 cm and containing biopsy marker. This is 2.5 cm anterior to the chest wall. There is a dominant 4 mm focus in the outer left breast ([id number]) for which MRI guided bx is rec. Axilla/other: No significant axillary or internal mammary lymphadenopathy. Impression: moderate to severe misregistration is visualized within the bilateral breasts, secondary to patient motion, therefore limiting sensitivity of this breast MRI. Bx-proven malignancy in the 12:00 axis of the left breast measuring up to 1.1 cm. Continued surgical/oncologic management is recommended. Dominant 4 mm focus of enhancement in the outer left breast for which MRI guided core needle biopsy is recommended. Rec MR Bx. BR4B.  4/22/24 JASKARAN Baker MR guided breast bx: Columnar cell change and cysts. Benign and Concordant. Rec MRI in 1 yr.  2/3/2025 Olivia RAO dMMG: There are scattered areas of FG density. No suspicious mass, suspicious microcalcifications, or other sign of malignancy is identified. IMPRESSION: No mammographic evidence of malignancy. RECOMMENDATION: Mammography in 1 year. BR1.  Fhx: No known family history of cancers. Not AJ. 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 Carpal tunnel surgery at the end of June ?

## 2025-02-27 NOTE — HISTORY OF PRESENT ILLNESS
[FreeTextEntry1] : PCP Rustam Cotto MD  Patient is a 56-year-old, Myriad negative, female here today for follow up visit.  She is s/p 6/6/24 L mastectomy and SLNBx with TE per Dr. Crane after US bx L 12:00 N3 resulted as IDC, Gr1, measures at least 4mm, ADH, LCIS, classic type. ER+ >90%, MT+ >80%, Her2 negative. Also had MRI biopsy L breast which was benign.  Surgery had been delayed as she underwent carpal tunnel surgery prior to breast surgery.  6/6/24 Surgical path: IDC Gr1, 1.0cm. All margins negative, >5mm. No DCIS. Extensive LCIS, classic type. LCIS involves lactiferous ducts. ADH. No lymphatic or vascular invasion. 0/7LN negative. pT1bN0/7   She had second stage surgery 11/15/24 with Dr. Crane.   Currently on Arimidex since 9/1/24. Was started on vaginal estradiol twice weekly recently for dyspareunia and atrophic vaginitis by GYN.  Med/Onc is Dr. Byrne/Sita Ann NP. Oncotype resulted as 8, <1% CT benefit.  DEXA 2/2024 showed mild osteopenia.  3/18/24 Olivia, L 12:00 N3 US guided bx: IDC, Gr1, measures at least 4mm, ADH, LCIS, classic type. ER+ >90%, MT+ >80%, Her2 negative. Concordant. Rec surgical or oncological management.  4/5/24 ADNA Baker: R- No susp enhancement. L- Irregular enhancing mass in the upper central left breast at 12:00 axis measuring 1.1 cm and containing biopsy marker. This is 2.5 cm anterior to the chest wall. There is a dominant 4 mm focus in the outer left breast ([id number]) for which MRI guided bx is rec. Axilla/other: No significant axillary or internal mammary lymphadenopathy. Impression: moderate to severe misregistration is visualized within the bilateral breasts, secondary to patient motion, therefore limiting sensitivity of this breast MRI. Bx-proven malignancy in the 12:00 axis of the left breast measuring up to 1.1 cm. Continued surgical/oncologic management is recommended. Dominant 4 mm focus of enhancement in the outer left breast for which MRI guided core needle biopsy is recommended. Rec MR Bx. BR4B.  4/22/24 JASKARAN Baker MR guided breast bx: Columnar cell change and cysts. Benign and Concordant. Rec MRI in 1 yr.  2/3/2025 Olivia RAO dMMG: There are scattered areas of FG density. No suspicious mass, suspicious microcalcifications, or other sign of malignancy is identified. IMPRESSION: No mammographic evidence of malignancy. RECOMMENDATION: Mammography in 1 year. BR1.  Fhx: No known family history of cancers. Not AJ. 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 Carpal tunnel surgery at the end of June ?

## 2025-02-27 NOTE — ASSESSMENT
[FreeTextEntry1] : PCP Rustam Cotto MD  Patient is a 56-year-old, Myriad negative, female here today for follow up visit.  She is s/p 6/6/24 L mastectomy and SLNBx with TE per Dr. Crane after US bx L 12:00 N3 resulted as IDC, Gr1, measures at least 4mm, ADH, LCIS, classic type. ER+ >90%, AL+ >80%, Her2 negative. Also had MRI biopsy L breast which was benign.  Surgery had been delayed as she underwent carpal tunnel surgery prior to breast surgery.  6/6/24 Surgical path: IDC Gr1, 1.0cm. All margins negative, >5mm. No DCIS. Extensive LCIS, classic type. LCIS involves lactiferous ducts. ADH. No lymphatic or vascular invasion. 0/7LN negative. pT1bN0/7   She had second stage surgery 11/15/24 with Dr. Crane.   Currently on Arimidex since 9/1/24. Was started on vaginal estradiol twice weekly recently for dyspareunia and atrophic vaginitis by GYN.  Med/Onc is Dr. Byrne/Sita Ann NP. Oncotype resulted as 8, <1% CT benefit.  DEXA 2/2024 showed mild osteopenia.  3/18/24 Olivia, L 12:00 N3 US guided bx: IDC, Gr1, measures at least 4mm, ADH, LCIS, classic type. ER+ >90%, AL+ >80%, Her2 negative. Concordant. Rec surgical or oncological management.  4/5/24 ADAN Baker: R- No susp enhancement. L- Irregular enhancing mass in the upper central left breast at 12:00 axis measuring 1.1 cm and containing biopsy marker. This is 2.5 cm anterior to the chest wall. There is a dominant 4 mm focus in the outer left breast ([id number]) for which MRI guided bx is rec. Axilla/other: No significant axillary or internal mammary lymphadenopathy. Impression: moderate to severe misregistration is visualized within the bilateral breasts, secondary to patient motion, therefore limiting sensitivity of this breast MRI. Bx-proven malignancy in the 12:00 axis of the left breast measuring up to 1.1 cm. Continued surgical/oncologic management is recommended. Dominant 4 mm focus of enhancement in the outer left breast for which MRI guided core needle biopsy is recommended. Rec MR Bx. BR4B.  4/22/24 JASKARAN Baker MR guided breast bx: Columnar cell change and cysts. Benign and Concordant. Rec MRI in 1 yr.  2/3/2025 Olivia RAO dMMG: There are scattered areas of FG density. No suspicious mass, suspicious microcalcifications, or other sign of malignancy is identified. IMPRESSION: No mammographic evidence of malignancy. RECOMMENDATION: Mammography in 1 year. BR1.  Fhx: No known family history of cancers. Not AJ. 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 Carpal tunnel surgery at the end of June ?  CBE: 38C left mastectomy with implant, breast and axillary scars healed well. no NAC. No axillary or SC lymphadenopathy. No lymphedema. Has full ROM.   Reviewed recent BR1 right mmg and negative CBE.  Pt saw Dr. Mercer and has f.u with him in April. She also has f.u with Dr. Mercer. She is planning on Left NAC recon but not sure about the right symmetry surgery.  PLAN: Continue anastrazole. SOZO due in April. F.u with us 6 mos.

## 2025-02-27 NOTE — HISTORY OF PRESENT ILLNESS
[FreeTextEntry1] : PCP Rustam Cotto MD  Patient is a 56-year-old, Myriad negative, female here today for follow up visit.  She is s/p 6/6/24 L mastectomy and SLNBx with TE per Dr. Crane after US bx L 12:00 N3 resulted as IDC, Gr1, measures at least 4mm, ADH, LCIS, classic type. ER+ >90%, KY+ >80%, Her2 negative. Also had MRI biopsy L breast which was benign.  Surgery had been delayed as she underwent carpal tunnel surgery prior to breast surgery.  6/6/24 Surgical path: IDC Gr1, 1.0cm. All margins negative, >5mm. No DCIS. Extensive LCIS, classic type. LCIS involves lactiferous ducts. ADH. No lymphatic or vascular invasion. 0/7LN negative. pT1bN0/7   She had second stage surgery 11/15/24 with Dr. Crane.   Currently on Arimidex since 9/1/24. Was started on vaginal estradiol twice weekly recently for dyspareunia and atrophic vaginitis by GYN.  Med/Onc is Dr. Byrne/Sita Ann NP. Oncotype resulted as 8, <1% CT benefit.  DEXA 2/2024 showed mild osteopenia.  3/18/24 Olivia, L 12:00 N3 US guided bx: IDC, Gr1, measures at least 4mm, ADH, LCIS, classic type. ER+ >90%, KY+ >80%, Her2 negative. Concordant. Rec surgical or oncological management.  4/5/24 ADAN Baker: R- No susp enhancement. L- Irregular enhancing mass in the upper central left breast at 12:00 axis measuring 1.1 cm and containing biopsy marker. This is 2.5 cm anterior to the chest wall. There is a dominant 4 mm focus in the outer left breast ([id number]) for which MRI guided bx is rec. Axilla/other: No significant axillary or internal mammary lymphadenopathy. Impression: moderate to severe misregistration is visualized within the bilateral breasts, secondary to patient motion, therefore limiting sensitivity of this breast MRI. Bx-proven malignancy in the 12:00 axis of the left breast measuring up to 1.1 cm. Continued surgical/oncologic management is recommended. Dominant 4 mm focus of enhancement in the outer left breast for which MRI guided core needle biopsy is recommended. Rec MR Bx. BR4B.  4/22/24 JASKARAN Baker MR guided breast bx: Columnar cell change and cysts. Benign and Concordant. Rec MRI in 1 yr.  2/3/2025 Olivia RAO dMMG: There are scattered areas of FG density. No suspicious mass, suspicious microcalcifications, or other sign of malignancy is identified. IMPRESSION: No mammographic evidence of malignancy. RECOMMENDATION: Mammography in 1 year. BR1.  Fhx: No known family history of cancers. Not AJ. 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 Carpal tunnel surgery at the end of June ?

## 2025-02-27 NOTE — ASSESSMENT
[FreeTextEntry1] : PCP Rustam Cotto MD  Patient is a 56-year-old, Myriad negative, female here today for follow up visit.  She is s/p 6/6/24 L mastectomy and SLNBx with TE per Dr. Crane after US bx L 12:00 N3 resulted as IDC, Gr1, measures at least 4mm, ADH, LCIS, classic type. ER+ >90%, OR+ >80%, Her2 negative. Also had MRI biopsy L breast which was benign.  Surgery had been delayed as she underwent carpal tunnel surgery prior to breast surgery.  6/6/24 Surgical path: IDC Gr1, 1.0cm. All margins negative, >5mm. No DCIS. Extensive LCIS, classic type. LCIS involves lactiferous ducts. ADH. No lymphatic or vascular invasion. 0/7LN negative. pT1bN0/7   She had second stage surgery 11/15/24 with Dr. Crane.   Currently on Arimidex since 9/1/24. Was started on vaginal estradiol twice weekly recently for dyspareunia and atrophic vaginitis by GYN.  Med/Onc is Dr. Byrne/Sita Ann NP. Oncotype resulted as 8, <1% CT benefit.  DEXA 2/2024 showed mild osteopenia.  3/18/24 Olivia, L 12:00 N3 US guided bx: IDC, Gr1, measures at least 4mm, ADH, LCIS, classic type. ER+ >90%, OR+ >80%, Her2 negative. Concordant. Rec surgical or oncological management.  4/5/24 ADAN Baker: R- No susp enhancement. L- Irregular enhancing mass in the upper central left breast at 12:00 axis measuring 1.1 cm and containing biopsy marker. This is 2.5 cm anterior to the chest wall. There is a dominant 4 mm focus in the outer left breast ([id number]) for which MRI guided bx is rec. Axilla/other: No significant axillary or internal mammary lymphadenopathy. Impression: moderate to severe misregistration is visualized within the bilateral breasts, secondary to patient motion, therefore limiting sensitivity of this breast MRI. Bx-proven malignancy in the 12:00 axis of the left breast measuring up to 1.1 cm. Continued surgical/oncologic management is recommended. Dominant 4 mm focus of enhancement in the outer left breast for which MRI guided core needle biopsy is recommended. Rec MR Bx. BR4B.  4/22/24 JASKARAN Baker MR guided breast bx: Columnar cell change and cysts. Benign and Concordant. Rec MRI in 1 yr.  2/3/2025 Olivia RAO dMMG: There are scattered areas of FG density. No suspicious mass, suspicious microcalcifications, or other sign of malignancy is identified. IMPRESSION: No mammographic evidence of malignancy. RECOMMENDATION: Mammography in 1 year. BR1.  Fhx: No known family history of cancers. Not AJ. 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 Carpal tunnel surgery at the end of June ?  CBE: 38C left mastectomy with implant, breast and axillary scars healed well. no NAC. No axillary or SC lymphadenopathy. No lymphedema. Has full ROM.   Reviewed recent BR1 right mmg and negative CBE.  Pt saw Dr. Mercer and has f.u with him in April. She also has f.u with Dr. Mercer. She is planning on Left NAC recon but not sure about the right symmetry surgery.  PLAN: Continue anastrazole. SOZO due in April. F.u with us 6 mos.

## 2025-03-21 NOTE — HISTORY OF PRESENT ILLNESS
[de-identified] : Referred by: Dr. Sapphire Sharma PCP: Dr. Sue Carr  Diagnosis: Breast Cancer    Ms. Watkins is a post-menopausal female who initially presented at age 55 in 2024 for evaluation of newly diagnosed left breast cancer.  The patient has a medical history of carpal tunnel surgery.   Sarah underwent a screening mammogram in 2024 which revealed a focal asymmetry in the left breast.  Her last mammogram had been 4 years prior to presentation. She denied any breast complaints at that time including breast mass, nipple discharge or breast pain. Diagnostic imaging confirmed a 7 mm hypoechoic mass in the left breast and biopsy was recommended.  Left breast biopsy on 2024 revealed invasive ductal carcinoma, well-differentiated, measuring at least 4 mm, ER greater than 90%, NV 80% and HER2 negative. Breast MRI was performed on 24 and revealed an irregular enhancing mass in the upper central left breast measuring 1.1cm, dominant 4mm focus in the outer left breast. Additional left breast biopsy was benign.  Genetic testing was sent and was negative. She was planning for a left mastectomy with TE placement w/ Dr. Sharma and Dr. Ramos.   She presented to discuss adjuvant therapy for hormone positive breast cancer.  Imaging reviewed: Screening MMG 24- focal asymmetry in the left breast - BIRADS 0 Left breast diagnostic MMG/US 3/12/24- 7mm hypoechoic mass with irregular margins, biopsy recommended, no abnormal LAD, BIRADS 4C Breast MRI 24- irregular enhancing mass in the upper central left breast measuring 1.1cm, dominant 4mm focus in the outer left breast, rec MRI bx, BIRADS 4B  Pathology reviewed: Left breast biopsy 3/22/24- IDC, well differentiated, measuring at least 4mm, ADH, LCIS (classic type), ER >90%, NV 80%, Her2 negative 1+ Left breast biopsy 24- cysts, columnar cell changes  Genetics: Myriad negative 3/27/24    HCM: - Colonoscopy: never done, DUE  - Gyn/pap: pap 24- negative for intraepithelial lesion or malignancy  - Mammo: as above  - Lung cancer screen: reportedly did CT chest for lung cancer screen in March  - DEXA: 24- osteopenia femoral neck T -1.4   SH: - Occupation: she sells wine - Living situation: lives in [place]  - Smoking/etoh/illicits: drinks 1-2 drinks per day, former smoker, quit in 2024 - Exercise: she is active, gardens    OB/GYN Hx: - Age of menarche: 15 - Age of menopause: 45 - Pregnancy history:  - Age at live birth: 26 - OCP use: n/a, IUD  - Fertility treatments:  n/a - Hormonal therapy: n/a - Breast feeding history: 13 months, 8 months    FH: - No family history of breast cancer  [de-identified] : Sarah (Satya) presents on 3/21/25 for follow up for breast cancer.  S/p Left mastectomy w/ SLNbx on 6/6/24 with Dr. Sapphire Sharma and Dr. Lissa Crane  Final pathology revealed IDC, well differentiated, measuring 10mm, extensive LCIS, ADH, LN 0/7 pT1b,N0 Developed a tissue expander infection in early July, s/p tissue expander removal and washout on 7/5/24 Path revealed ulcerated skin with acute inflammation and gangrenous necrosis, breast tissue culture grew group B strep.  Completed IV antibiotics via PICC line Her oncotype score has resulted as 8 with 3% risk of distant recurrence at 9 years and <1% absolute chemotherapy benefit. She had a new tissue expander placed; now s/p L IEX on 11/15/24 w/ Dr. Crane (elected to defer R oncoplastics). Started Arimidex 9/1/24  At today's visit Satya reports persistent arthralgias since starting Arimidex (worst in hands, knees and feet).  Has had surgery for L trigger finger (may also need R CTS).  Following w/ K. SABRA Han, in gyn - pre-existing dyspareunia improved with low dose vaginal estrogen cream.  Hot flushes also improved w/ Thermella supplement (from [employer]).  Mood still down at times (possibly r/t current events and family stresses).  Mild brain fog also persists.   Denies recent breast changes, headaches, dizziness, visual changes, balance issues, CP, cough, SOB, n/v/d, constipation, unintentional weight loss or new bone pain.  Health Care Maintenance: Updated 3/21/25 Breast Imaging: Right mammogram due 2/2026 Colonoscopy:  DUE - reminded to schedule Gyn/Pap:  UTD Dentist: PCP: Dermatology screen:  DUE - reminded to schedule DEXA 2/27/24- mild osteopenia T -1.4

## 2025-03-21 NOTE — RESULTS/DATA
[FreeTextEntry1] : #Left breast cancer- ER/OK+, Her2 negative, clinical stage IA  Diagnosed on screening mammogram in February 2024. S/p left breast biopsy on 3/22/24 which revealed invasive ductal carcinoma, well-differentiated, measuring at least 4 mm, ER >90%, [place] 80% and HER2 negative.  Breast MRI was performed on 4/5/24 and revealed an irregular enhancing mass in the upper central left breast measuring 1.1cm, dominant 4mm focus in the outer left breast. Additional left breast biopsy was benign.   Genetic testing was sent and was negative.  We discussed the excellent prognosis of stage I, ER positive, OK positive, node negative breast cancer. We explained that recent data suggests that chemotherapy may be spared for many patients with this type of breast cancer (up to 85%). We discussed the use of Oncotype DX genomic profile to determine the risk of recurrence and benefit from chemotherapy based on the results of the [employer] Rx Study to better determine which patients should receive chemotherapy in addition to hormonal therapy.   Now s/p Left mastectomy w/ SLNbx on 6/6/24 with Dr. Sapphire Sharma and Dr. Lissa Crane  Final pathology revealed IDC, well differentiated, measuring 10mm, extensive LCIS, ADH, LN 0/7 pT1b,N0 She developed a tissue expander infection in early July, s/p tissue expander removal and washout on 7/5/24 Path revealed ulcerated skin with acute inflammation and gangrenous necrosis, breast tissue culture grew group B strep.  Completed IV antibiotics via PICC line  Her oncotype score has resulted as 8 with 3% risk of distant recurrence at 9 years and <1% absolute chemotherapy benefit. Fortunately no role for chemotherapy.  She had a new tissue expander placed; now s/p L implant exchange w/ Dr. Crane 11/15/24 and healing well She elected not to proceed w/ right oncoplastics    We discussed the role of antiestrogen therapy, including treatment with Arimidex 1mg PO daily for 5-7 years. Risks of hot flashes, vaginal dryness and bone loss were reviewed.  Close monitoring of bone density and calcium supplementation reviewed as well. Recommended 30 minutes of exercise daily to prevent joint pains. Started Arimidex 9/1/24 - developed worsening hot flushes, arthralgias, brain fog and depressed mood. Hot flushes slowly improving w/ Thermella Pre-existing dyspareunia now improved w/ vaginal estrogen (felt to have minimal systemic absorption and convey low risk for breast cancer recurrence); will continue to f/u with gyn Discussed symptoms at length today - will discontinue Arimidex now, begin exemestane in 3 weeks Urged to increase OTC vitamin D3 supplements by 1,000 - 2,000 IU daily now Has been given resources to initiate counseling, emotional support/encouragement provided DEXA 2/27/24- mild osteopenia T -1.4 Next routine visit 6/5 as scheduled w/ JESUS Ann NP (subsequent visit w/ Dr. Byrne) All patient questions answered at today's visit. Patient urged to call the office with any questions or concerns.  I personally have spent a total of 40 minutes of time on the date of this encounter reviewing test results, documenting findings, coordinating care and directly consulting with the patient and/or designated family member. Greater than 50% of the face to face encounter time was spent on counseling and/or coordination of care for left breast cancer.

## 2025-03-21 NOTE — PHYSICAL EXAM
[Fully active, able to carry on all pre-disease performance without restriction] : Status 0 - Fully active, able to carry on all pre-disease performance without restriction [Normal] : affect appropriate [de-identified] : well appearing female, NAD, pleasant [de-identified] : s/p L mastectomy with implant reconstruction, no erythema, no palpable masses or LAD  [de-identified] : Very faint, punctate skin changes of anterior chest and mid back noted (no evidence for recent scratching).

## 2025-04-01 NOTE — PHYSICAL EXAM
[FreeTextEntry1] : Gen: Patient is A&O x 3, NAD HEENT: EOMI, hearing grossly normal Resp: regular, non - labored CV: pulses regular Skin: no rashes, erythema Lymph: no clubbing, cyanosis, edema Inspection: no instability  ROM: full throughout Palpation:no tenderness to palpation Sensation: intact to light touch Reflexes: 1+ and symmetric throughout Strength: 5/5 throughout Gait: normal, non-antalgic  Bioimpedance spectroscopy performed today with L-Dex -4.3 LUE (pre-op baseline -2.6)

## 2025-04-01 NOTE — HISTORY OF PRESENT ILLNESS
[FreeTextEntry1] : Ms. Watkins is a 55 year old female with history of Left breast cancer- ER/CT+, Her2 negative, clinical stage IA Diagnosed on screening mammogram in February 2024.  S/p left breast biopsy on 3/22/24 which revealed invasive ductal carcinoma, well-differentiated, measuring at least 4 mm, ER >90%, CT 80% and HER2 negative.  She is s/p Left mastectomy w/ SLNbx on 6/6/24 with Dr. Sapphire Sharma and Dr. Lissa Crane, Final pathology revealed IDC, well differentiated, measuring 10mm, extensive LCIS, ADH, LN 0/7 pT1b,N0.   She developed a tissue expander infection in early July, s/p tissue expander removal and washout on 7/5/24. S/p Left implant exchange 11/15/24. Stopped arimidex, pending exemestane.   Since her last visit she had significant arthralgias related to Arimidex and discontinued.  Reports joint pains are improving with this.  Pending starting exemestane.  Denies any swelling or heaviness upper extremity.  Has been taking gabapentin at night which is helping, no side effects.

## 2025-04-01 NOTE — ASSESSMENT
[FreeTextEntry1] : 55 year old female presenting for evaluation.  #Aromatase Inhibitor Arthralgias/Hot flashes: -Continue gabapentin 100mg nightly  #Post-mastectomy pain/Breast cancer: -gabapentin  -Educated patient on home exercise program with goal of moderate intensity aerobic activity 150 to 300 minutes/week. -Educated on low resistance strength training of major muscle groups 2-3 times per week.     #At risk for lymphedema: -No clinical signs of lymphedema on exam -Lymphedema education provided to patient -Denies any electronic implantable devices or pregnancy  -Bioimpedance spectroscopy performed today with L-dex appropriate. -Next surveillance in July 2025  Follow up in 3 months.  The patient had a follow-up SOZO measurement which I reviewed today.  Bioimpedance spectroscopy helps identify the onset of lymphedema in an arm or leg before patients experience noticeable swelling. Research has shown that the early detection of lymphedema using L-Dex combined with treatment can reduce progression to chronic lymphedema by 95% in breast cancer patients. Whenever possible, patients are tested for baseline L-Dex score before cancer treatment begins and then are reassessed during regular follow-up visits using the SOZO device. Otherwise, this can be started postoperatively and continued during regular follow-up visits. If the patients L-Dex score increases above normal levels, that is a sign that lymphedema is developing, and a referral is made to physical therapy for further evaluation and/or early compression treatment. Lymphedema assessment with the SOZO L-Dex score is recommended to be done every 3 months for the first 3 years and then every 6 months for years 4 and 5, followed by annually afterwards.

## 2025-05-14 NOTE — PHYSICAL EXAM
[NI] : Normal [de-identified] : Left reconstructed breast with silicone implant in place, soft and nontender. Scars are mature, soft.  [de-identified] : No left breast, s/p mastectomy. Right breast with Grade 3 ptosis. Larger than left recon breast.

## 2025-05-14 NOTE — PHYSICAL EXAM
[NI] : Normal [de-identified] : Left reconstructed breast with silicone implant in place, soft and nontender. Scars are mature, soft.  [de-identified] : No left breast, s/p mastectomy. Right breast with Grade 3 ptosis. Larger than left recon breast.

## 2025-05-14 NOTE — ADDENDUM
[FreeTextEntry1] : All medical record entries were at my direction and personally dictated by me (Dr. Lissa Crane). I have reviewed the chart and agree that the record accurately reflects my personal performance of the history, physical exam, assessment and plan. Once a surgical date is selected, she should come back to review the R/B/A of right breast reduction as well as pain management, incision care etc.

## 2025-05-14 NOTE — ASSESSMENT
[FreeTextEntry1] : 55 y/o female for follow up   Healing well Plan is for breast reduction of right breast for symmetry in the summer or in November 2025 depending on when her right carpal tunnel surgery will be performed.  Plan is for patient to call next week after her visit with hand specialist to determine her availablity. All questions and concerns addressed. Plan and patient status as per Dr Crane.

## 2025-05-14 NOTE — REASON FOR VISIT
Referring Physician (Optional): Poonam Oneill MD [Follow-Up: _____] : a [unfilled] follow-up visit [FreeTextEntry1] : feeling good

## 2025-05-14 NOTE — ASSESSMENT
[FreeTextEntry1] : 57 y/o female for follow up   Healing well Plan is for breast reduction of right breast for symmetry in the summer or in November 2025 depending on when her right carpal tunnel surgery will be performed.  Plan is for patient to call next week after her visit with hand specialist to determine her availablity. All questions and concerns addressed. Plan and patient status as per Dr Crane.

## 2025-05-14 NOTE — HISTORY OF PRESENT ILLNESS
[FreeTextEntry1] : Patient presents for follow up and states that she is doing well and is happy with the way her left implant feels.  She states that she had a mammogram in March 2025 that was normal.  She has discontinued Anastrozole due to side effects including trigger fingers, neuropathy, and joint pain.  She has put thought into the fact that she is ready for right breast reduction for symmetry.  She has no other complaints.

## 2025-07-01 NOTE — HISTORY OF PRESENT ILLNESS
[FreeTextEntry1] : Ms. Watkins is a 55 year old female with history of Left breast cancer- ER/AZ+, Her2 negative, clinical stage IA Diagnosed on screening mammogram in February 2024.  S/p left breast biopsy on 3/22/24 which revealed invasive ductal carcinoma, well-differentiated, measuring at least 4 mm, ER >90%, [place] 80% and HER2 negative.  She is s/p Left mastectomy w/ SLNbx on 6/6/24 with Dr. Sapphire Sharma and Dr. Lissa Crane, Final pathology revealed IDC, well differentiated, measuring 10mm, extensive LCIS, ADH, LN 0/7 pT1b,N0.   She developed a tissue expander infection in early July, s/p tissue expander removal and washout on 7/5/24. S/p Left implant exchange 11/15/24. Stopped arimidex, pending exemestane.   She reports that she still is having hot flashes and night.  She states that she is not having any swelling or heaviness upper extremity or chest wall.  Reports that she is planning to restart exemestane.  Recently had right carpal tunnel surgery reports going well plan to start rehab.

## 2025-07-01 NOTE — ASSESSMENT
[FreeTextEntry1] : 55 year old female presenting for evaluation.  #Aromatase Inhibitor Arthralgias/Hot flashes: -Pending exemestane -Increase gabapentin to 200mg nightly   #Post-mastectomy pain/Breast cancer: -gabapentin  -Continue HEP   #At risk for lymphedema: -No clinical signs of lymphedema on exam -Lymphedema education provided to patient -Denies any electronic implantable devices or pregnancy  -Defer Bioimpedance spectroscopy given hand surgery -Next surveillance at next visit   Follow up in 1 month.

## 2025-07-14 NOTE — HISTORY OF PRESENT ILLNESS
[de-identified] : Referred by: Dr. Sapphire Sharma PCP: Dr. Sue Carr  Diagnosis: Breast Cancer    Ms. Watkins is a post-menopausal female who initially presented at age 55 in 2024 for evaluation of newly diagnosed left breast cancer.  The patient has a medical history of carpal tunnel surgery.   Sarah underwent a screening mammogram in 2024 which revealed a focal asymmetry in the left breast.  Her last mammogram had been 4 years prior to presentation. She denied any breast complaints at that time including breast mass, nipple discharge or breast pain. Diagnostic imaging confirmed a 7 mm hypoechoic mass in the left breast and biopsy was recommended.  Left breast biopsy on 2024 revealed invasive ductal carcinoma, well-differentiated, measuring at least 4 mm, ER greater than 90%, [place] 80% and HER2 negative. Breast MRI was performed on 24 and revealed an irregular enhancing mass in the upper central left breast measuring 1.1cm, dominant 4mm focus in the outer left breast. Additional left breast biopsy was benign.  Genetic testing was sent and was negative. She was planning for a left mastectomy with TE placement w/ Dr. Sharma and Dr. Ramos.   She presented to discuss adjuvant therapy for hormone positive breast cancer.  Imaging reviewed: Screening MMG 24- focal asymmetry in the left breast - BIRADS 0 Left breast diagnostic MMG/US 3/12/24- 7mm hypoechoic mass with irregular margins, biopsy recommended, no abnormal LAD, BIRADS 4C Breast MRI 24- irregular enhancing mass in the upper central left breast measuring 1.1cm, dominant 4mm focus in the outer left breast, rec MRI bx, BIRADS 4B  Pathology reviewed: Left breast biopsy 3/22/24- IDC, well differentiated, measuring at least 4mm, ADH, LCIS (classic type), ER >90%, [place] 80%, Her2 negative 1+ Left breast biopsy 24- cysts, columnar cell changes  Genetics: Myriad negative 3/27/24    HCM: - Colonoscopy: never done, DUE  - Gyn/pap: pap 24- negative for intraepithelial lesion or malignancy  - Mammo: as above  - Lung cancer screen: reportedly did CT chest for lung cancer screen in March  - DEXA: 24- osteopenia femoral neck T -1.4   SH: - Occupation: she sells wine - Living situation: lives in [place]  - Smoking/etoh/illicits: drinks 1-2 drinks per day, former smoker, quit in 2024 - Exercise: she is active, gardens    OB/GYN Hx: - Age of menarche: 15 - Age of menopause: 45 - Pregnancy history:  - Age at live birth: 26 - OCP use: n/a, IUD  - Fertility treatments:  n/a - Hormonal therapy: n/a - Breast feeding history: 13 months, 8 months    FH: - No family history of breast cancer  [de-identified] : Sarah (Satya) presents on 7/14/25 for follow up for breast cancer.  S/p Left mastectomy w/ SLNbx on 6/6/24 with Dr. Sapphire Sharma and Dr. Lissa Crane  Final pathology revealed IDC, well differentiated, measuring 10mm, extensive LCIS, ADH, LN 0/7 pT1b,N0 Developed a tissue expander infection in early July, s/p tissue expander removal and washout on 7/5/24 Path revealed ulcerated skin with acute inflammation and gangrenous necrosis, breast tissue culture grew group B strep.  Completed IV antibiotics via PICC line Her oncotype score has resulted as 8 with 3% risk of distant recurrence at 9 years and <1% absolute chemotherapy benefit. She had a new tissue expander placed; now s/p L IEX on 11/15/24 w/ Dr. Crane (elected to defer R oncoplastics). Started Arimidex 9/1/24 (dc'd 4/1/25 d/t hot flushes, arthralgias, mood changes, vaginal dryness) Started exemestane 4/14/25  At today's visit Satya reports feeling much better since stopping Arimidex (discontinued 4/1/25).   She had surgery for R trigger finger and returned to the OR for R CTS and R ulnar nerve release on 6/26/25, healing well.   Following w/ KClaudia Han NP, in gyn - pre-existing dyspareunia improved with low dose vaginal estrogen cream (holding since she discontinued Arimidex).  Hot flushes also improved w/ Thermella supplement (from [employer]).  Mood has improved.  After much consideration, she elected to begin exemestane today (7/14/25).    Denies recent breast/chest wall changes, headaches, dizziness, visual changes, balance issues, CP, cough, SOB, n/v/d, constipation, unintentional weight loss or new bone pain.  Health Care Maintenance: Updated 7/14/25 Breast Imaging: Right mammogram due 2/2026 Colonoscopy:  DUE - reminded to schedule Gyn/Pap:  UTD Dentist: PCP: UTD Dermatology screen:  6/2025 DEXA 2/27/24- mild osteopenia T -1.4

## 2025-07-14 NOTE — PHYSICAL EXAM
[Fully active, able to carry on all pre-disease performance without restriction] : Status 0 - Fully active, able to carry on all pre-disease performance without restriction [Normal] : affect appropriate [de-identified] : well appearing female, NAD, pleasant [de-identified] : s/p L mastectomy with implant reconstruction, no erythema, no palpable masses or LAD